# Patient Record
Sex: MALE | Race: OTHER | NOT HISPANIC OR LATINO
[De-identification: names, ages, dates, MRNs, and addresses within clinical notes are randomized per-mention and may not be internally consistent; named-entity substitution may affect disease eponyms.]

---

## 2019-07-23 ENCOUNTER — RESULT REVIEW (OUTPATIENT)
Age: 59
End: 2019-07-23

## 2019-12-03 ENCOUNTER — EMERGENCY (EMERGENCY)
Facility: HOSPITAL | Age: 59
LOS: 1 days | End: 2019-12-03
Attending: EMERGENCY MEDICINE
Payer: MEDICARE

## 2019-12-03 VITALS
TEMPERATURE: 98 F | HEART RATE: 74 BPM | RESPIRATION RATE: 16 BRPM | OXYGEN SATURATION: 97 % | DIASTOLIC BLOOD PRESSURE: 79 MMHG | SYSTOLIC BLOOD PRESSURE: 137 MMHG

## 2019-12-03 VITALS
HEART RATE: 92 BPM | HEIGHT: 68 IN | DIASTOLIC BLOOD PRESSURE: 82 MMHG | WEIGHT: 145.95 LBS | RESPIRATION RATE: 18 BRPM | TEMPERATURE: 98 F | OXYGEN SATURATION: 96 % | SYSTOLIC BLOOD PRESSURE: 150 MMHG

## 2019-12-03 LAB
ALBUMIN SERPL ELPH-MCNC: 4.1 G/DL — SIGNIFICANT CHANGE UP (ref 3.3–5)
ALP SERPL-CCNC: 92 U/L — SIGNIFICANT CHANGE UP (ref 40–120)
ALT FLD-CCNC: 13 U/L — SIGNIFICANT CHANGE UP (ref 10–45)
ANION GAP SERPL CALC-SCNC: 11 MMOL/L — SIGNIFICANT CHANGE UP (ref 5–17)
AST SERPL-CCNC: 15 U/L — SIGNIFICANT CHANGE UP (ref 10–40)
BASOPHILS # BLD AUTO: 0.01 K/UL — SIGNIFICANT CHANGE UP (ref 0–0.2)
BASOPHILS NFR BLD AUTO: 0.1 % — SIGNIFICANT CHANGE UP (ref 0–2)
BILIRUB SERPL-MCNC: 0.3 MG/DL — SIGNIFICANT CHANGE UP (ref 0.2–1.2)
BUN SERPL-MCNC: 14 MG/DL — SIGNIFICANT CHANGE UP (ref 7–23)
CALCIUM SERPL-MCNC: 8.9 MG/DL — SIGNIFICANT CHANGE UP (ref 8.4–10.5)
CHLORIDE SERPL-SCNC: 104 MMOL/L — SIGNIFICANT CHANGE UP (ref 96–108)
CO2 SERPL-SCNC: 24 MMOL/L — SIGNIFICANT CHANGE UP (ref 22–31)
CREAT SERPL-MCNC: 0.87 MG/DL — SIGNIFICANT CHANGE UP (ref 0.5–1.3)
CRP SERPL-MCNC: 0.15 MG/DL — SIGNIFICANT CHANGE UP (ref 0–0.4)
EOSINOPHIL # BLD AUTO: 0.08 K/UL — SIGNIFICANT CHANGE UP (ref 0–0.5)
EOSINOPHIL NFR BLD AUTO: 1.1 % — SIGNIFICANT CHANGE UP (ref 0–6)
ERYTHROCYTE [SEDIMENTATION RATE] IN BLOOD: 5 MM/HR — SIGNIFICANT CHANGE UP (ref 0–20)
GLUCOSE SERPL-MCNC: 97 MG/DL — SIGNIFICANT CHANGE UP (ref 70–99)
HCT VFR BLD CALC: 43.4 % — SIGNIFICANT CHANGE UP (ref 39–50)
HGB BLD-MCNC: 15.4 G/DL — SIGNIFICANT CHANGE UP (ref 13–17)
IMM GRANULOCYTES NFR BLD AUTO: 0.3 % — SIGNIFICANT CHANGE UP (ref 0–1.5)
LYMPHOCYTES # BLD AUTO: 2.52 K/UL — SIGNIFICANT CHANGE UP (ref 1–3.3)
LYMPHOCYTES # BLD AUTO: 34.7 % — SIGNIFICANT CHANGE UP (ref 13–44)
MCHC RBC-ENTMCNC: 29 PG — SIGNIFICANT CHANGE UP (ref 27–34)
MCHC RBC-ENTMCNC: 35.5 GM/DL — SIGNIFICANT CHANGE UP (ref 32–36)
MCV RBC AUTO: 81.7 FL — SIGNIFICANT CHANGE UP (ref 80–100)
MONOCYTES # BLD AUTO: 0.66 K/UL — SIGNIFICANT CHANGE UP (ref 0–0.9)
MONOCYTES NFR BLD AUTO: 9.1 % — SIGNIFICANT CHANGE UP (ref 2–14)
NEUTROPHILS # BLD AUTO: 3.97 K/UL — SIGNIFICANT CHANGE UP (ref 1.8–7.4)
NEUTROPHILS NFR BLD AUTO: 54.7 % — SIGNIFICANT CHANGE UP (ref 43–77)
NRBC # BLD: 0 /100 WBCS — SIGNIFICANT CHANGE UP (ref 0–0)
PLATELET # BLD AUTO: 244 K/UL — SIGNIFICANT CHANGE UP (ref 150–400)
POTASSIUM SERPL-MCNC: 4.1 MMOL/L — SIGNIFICANT CHANGE UP (ref 3.5–5.3)
POTASSIUM SERPL-SCNC: 4.1 MMOL/L — SIGNIFICANT CHANGE UP (ref 3.5–5.3)
PROT SERPL-MCNC: 6.5 G/DL — SIGNIFICANT CHANGE UP (ref 6–8.3)
RBC # BLD: 5.31 M/UL — SIGNIFICANT CHANGE UP (ref 4.2–5.8)
RBC # FLD: 12.8 % — SIGNIFICANT CHANGE UP (ref 10.3–14.5)
SODIUM SERPL-SCNC: 139 MMOL/L — SIGNIFICANT CHANGE UP (ref 135–145)
WBC # BLD: 7.26 K/UL — SIGNIFICANT CHANGE UP (ref 3.8–10.5)
WBC # FLD AUTO: 7.26 K/UL — SIGNIFICANT CHANGE UP (ref 3.8–10.5)

## 2019-12-03 PROCEDURE — 71260 CT THORAX DX C+: CPT

## 2019-12-03 PROCEDURE — 99284 EMERGENCY DEPT VISIT MOD MDM: CPT | Mod: 25

## 2019-12-03 PROCEDURE — 71260 CT THORAX DX C+: CPT | Mod: 26

## 2019-12-03 PROCEDURE — 73502 X-RAY EXAM HIP UNI 2-3 VIEWS: CPT

## 2019-12-03 PROCEDURE — 71045 X-RAY EXAM CHEST 1 VIEW: CPT

## 2019-12-03 PROCEDURE — 85027 COMPLETE CBC AUTOMATED: CPT

## 2019-12-03 PROCEDURE — 85652 RBC SED RATE AUTOMATED: CPT

## 2019-12-03 PROCEDURE — 71045 X-RAY EXAM CHEST 1 VIEW: CPT | Mod: 26

## 2019-12-03 PROCEDURE — 73502 X-RAY EXAM HIP UNI 2-3 VIEWS: CPT | Mod: 26,RT

## 2019-12-03 PROCEDURE — 86140 C-REACTIVE PROTEIN: CPT

## 2019-12-03 PROCEDURE — 80053 COMPREHEN METABOLIC PANEL: CPT

## 2019-12-03 PROCEDURE — 99284 EMERGENCY DEPT VISIT MOD MDM: CPT

## 2019-12-03 RX ORDER — LIDOCAINE 4 G/100G
1 CREAM TOPICAL ONCE
Refills: 0 | Status: COMPLETED | OUTPATIENT
Start: 2019-12-03 | End: 2019-12-03

## 2019-12-03 RX ORDER — IBUPROFEN 200 MG
600 TABLET ORAL ONCE
Refills: 0 | Status: COMPLETED | OUTPATIENT
Start: 2019-12-03 | End: 2019-12-03

## 2019-12-03 RX ADMIN — LIDOCAINE 1 PATCH: 4 CREAM TOPICAL at 11:51

## 2019-12-03 RX ADMIN — Medication 600 MILLIGRAM(S): at 13:33

## 2019-12-03 NOTE — ED PROVIDER NOTE - NSFOLLOWUPCLINICS_GEN_ALL_ED_FT
[facility] Orthopedic Surgery  Orthopedic Surgery  [address], 3rd & 4th [place] [address]  Phone: [phone]  Fax:   Follow Up Time:     [facility] General Internal Medicine  General Internal Medicine  [address] [place] [id number]  Phone: [phone]  Fax:   Follow Up Time:     [facility] Sports Medicine  Sports Medicine  [address] [place] [id number]  Phone: [phone]  Fax:   Follow Up Time:

## 2019-12-03 NOTE — ED PROVIDER NOTE - PROGRESS NOTE DETAILS
Attending Yamilka Pavon: ct scan showed evidence of osteochondroma. d/w pt results and importance of follow up. pt does not have a pcp and will provide him with information. pt does report improvement in pain to hip after motrin

## 2019-12-03 NOTE — ED PROVIDER NOTE - OBJECTIVE STATEMENT
PGY2/MD Juan. 60 yo M, with no medical follow up, scheduled for neck surgery for chronic neck pain, p/w right thigh pain/numbness and a lump on the right back. Worsening pain, x 7d, on the right hip, shooting with numbness, no weakness or fever. Denies urinary symptoms or defication issues. No fever, fatigue, or night sweat. No chest pain or sob. PGY2/MD Juan. 60 yo M, with no medical follow up, scheduled for neck surgery for chronic neck pain, p/w right thigh pain/numbness and a lump on the right back. Worsening pain, x 7d, on the right hip, shooting with numbness, no weakness or fever. Denies urinary symptoms or defication issues. No fever, fatigue, or night sweat. No chest pain or sob.  Attending Yamilka Pavon: 58 y/o male presenting with right hip pain with radiation down the side of the leg for last few days. pain is intermittent and worse with moving. no falls or trauma. able to walk. no fevers or chills. no recent surgeries. pain is reportedly shooting down his leg. has not tried anything for pain. additionally pt does report a lump to his upper back. was instructed by his neck doctor to have it checked out. has not seen a pcp recently.

## 2019-12-03 NOTE — ED ADULT NURSE NOTE - CHPI ED NUR SYMPTOMS NEG
no chills/no tingling/no vomiting/no nausea/no decreased eating/drinking/no dizziness/no fever/no weakness

## 2019-12-03 NOTE — ED PROVIDER NOTE - NSFOLLOWUPINSTRUCTIONS_ED_ALL_ED_FT
Your ct scan showed evidence of an osteochondroma. You were given the report of the imaging. It is very important that you follow up in with your doctor to monitor this and get repeat imaging as needed  Please return if you develop any fevers, any redness to your hip, or unable to walk  You can take tylenol and or motrin for the pain. You can take 400mg of motrin every 6 hours on a full stomach  Please follow up with the orthopedic doctors in 2-3 days for further monitoring

## 2019-12-03 NOTE — ED PROVIDER NOTE - PHYSICAL EXAMINATION
PGY2/MD Juan.   VITALS: reviewed  GEN: No apparent distress, A & O x 4  HEAD/EYES: NC/AT, PERRL, EOMI, anicteric sclerae, no conjunctival pallor  ENT: mucus membranes moist, oropharynx WNL, trachea midline, no JVD, neck is supple, no midline cervical tenderness  RESP: lungs CTA with equal breath sounds bilaterally, chest wall nontender and atraumatic  CV: heart with reg rhythm S1, S2, no murmur  ABDOMEN: normoactive bowel sounds, soft, nondistended, nontender, no palpable masses  : no CVAT  MSK: extremities atraumatic and nontender, no edema, no asymmetry. the back is without midline or lateral tenderness, there is no spinal deformity or stepoff and the back is ranged painlessly. the neck has no midline tenderness, deformity, or stepoff, and is ranged painlessly. + right hip, greater trochanter, local tenderness but no erythema. + right back, chest wall, demaculated, soft/solid, well mobilizing mump, x 5cm  SKIN: warm, dry, no rash, no bruising, no cyanosis. color appropriate for ethnicity  NEURO: alert, mentating appropriately, no facial asymmetry. gross sensation, motor, coordination are intact  PSYCH: Affect appropriate PGY2/MD Juan.   VITALS: reviewed  GEN: No apparent distress, A & O x 4  HEAD/EYES: NC/AT, PERRL, EOMI, anicteric sclerae, no conjunctival pallor  ENT: mucus membranes moist, oropharynx WNL, trachea midline, no JVD, neck is supple, no midline cervical tenderness  RESP: lungs CTA with equal breath sounds bilaterally, chest wall nontender and atraumatic  CV: heart with reg rhythm S1, S2, no murmur  ABDOMEN: normoactive bowel sounds, soft, nondistended, nontender, no palpable masses  : no CVAT  MSK: extremities atraumatic and nontender, no edema, no asymmetry. the back is without midline or lateral tenderness, there is no spinal deformity or stepoff and the back is ranged painlessly. the neck has no midline tenderness, deformity, or stepoff, and is ranged painlessly. + right hip, greater trochanter, local tenderness but no erythema. + right back, chest wall, demaculated, soft/solid, well mobilizing mump, x 5cm  SKIN: warm, dry, no rash, no bruising, no cyanosis. color appropriate for ethnicity  NEURO: alert, mentating appropriately, no facial asymmetry. gross sensation, motor, coordination are intact  PSYCH: Affect appropriate  Attending Yamilka Pavon: Gen: NAD, heent: atrauamtic, eomi, perrla, mmm, op pink, uvula midline, neck; nttp, no nuchal rigidity, chest: nttp, no crepitus, cv: rrr, no murmurs, lungs: ctab, abd: soft, nontender, nondistended, no peritoneal signs, +BS, no guarding, ext: wwp, ttp right greater tronchter area, no redness or warmth. able to ambulate  rasied area to back that Is firm and nontenderneg homans, skin: no rash, neuro: awake and alert, following commands, speech clear, sensation and strength intact, no focal deficits

## 2019-12-03 NOTE — ED PROVIDER NOTE - PATIENT PORTAL LINK FT
You can access the FollowMyHealth Patient Portal offered by [facility] by registering at the following website: [email or web address]. By joining [facility]’s FollowMyHealth portal, you will also be able to view your health information using other applications (apps) compatible with our system.

## 2019-12-03 NOTE — ED PROVIDER NOTE - CLINICAL SUMMARY MEDICAL DECISION MAKING FREE TEXT BOX
PGY2/MD Juan. 60 yo M with no medical follow up, p/w right hip pain + right back lump. The lump is likely lipoma, but given sat 94-96%, will check xray chest to r/o pleural effusion. hip, no clinical evidence of septic joint, but given poor medical follow up, will check cbc, crp, esr to r/o infection, likely orthro arrhritis PGY2/MD Juan. 58 yo M with no medical follow up, p/w right hip pain + right back lump. The lump is likely lipoma, but given sat 94-96%, will check xray chest to r/o pleural effusion. hip, no clinical evidence of septic joint, but given poor medical follow up, will check cbc, crp, esr to r/o infection, likely orthro arrhritis  Attending Yamilka Pavon: 60 y/o male prsenting with right hip pain and concern for lump to back. on exam pt able to range hip. no evidence of erythema or warmth. low risk for septic hip. neuro exam unremarkable. no bowel or bladder incontinence to suggest spinal cord involvment or caudal equina. esr sent and wnl. pt does have  alump to his back. ct performed showing likely osteochondroma. d/w pt results and importance of following up with the spine center and given pcp information. given close return precautions for any neuro changes, or fevers

## 2019-12-03 NOTE — ED ADULT NURSE NOTE - OBJECTIVE STATEMENT
The patient is a 59 year old male coming from home The patient is a 59 year old male coming from home with no past medical history with chief complaint of 8/10 pain on from his right hip down his right leg up to his calf and The patient is a 59 year old male coming from home with no past medical history with chief complaint of 8/10 pain on from his right hip down his right leg up to his calf and pain to his left mid-back at the site of a lipoma. Patient rates pain at both sites as 8/10. Patient states onset of pain at both sites is one week ago. Patient describes right hip/leg pain as constant, throbbing pain that is exacerbated with movement. Patient denies fall. Denies trauma.  Patient ambulatory.  Denies headache, dizziness, vision changes, chest pain, shortness of breath, abdominal pain, nausea, vomiting, diarrhea, fevers, chills, dysuria, hematuria, recent illness travel or fall.

## 2019-12-03 NOTE — ED ADULT TRIAGE NOTE - CHIEF COMPLAINT QUOTE
numbness/ pain to rt hip x 4 days,  no recent fall/ injury, lump to left back x 2 days ago.  Denies cough, fever, rash

## 2019-12-03 NOTE — ED PROVIDER NOTE - ATTENDING CONTRIBUTION TO CARE
Attending MD Yamilka Pavon:  I personally have seen and examined this patient.  Resident note reviewed and agree on plan of care and except where noted.  See HPI, PE, and MDM for details.

## 2019-12-03 NOTE — ED ADULT NURSE REASSESSMENT NOTE - NS ED NURSE REASSESS COMMENT FT1
Patient currently rates pain as 8/10. Medicated with Ibuprofren, will continue to re-assess pain level. V/s are stable. Patient aware of plan to wait for CT results.

## 2019-12-03 NOTE — ED ADULT NURSE REASSESSMENT NOTE - NS ED NURSE REASSESS COMMENT FT1
Patient Patient returned from X-ray. Wife and daughter at bedside. IV inserted. Patient provided blanket for comfort. Patient informed of wait for labwork to come back before Ibuprofen can be administered.

## 2019-12-13 PROBLEM — Z78.9 OTHER SPECIFIED HEALTH STATUS: Chronic | Status: ACTIVE | Noted: 2019-12-03

## 2019-12-16 PROBLEM — Z00.00 ENCOUNTER FOR PREVENTIVE HEALTH EXAMINATION: Status: ACTIVE | Noted: 2019-12-16

## 2019-12-17 ENCOUNTER — APPOINTMENT (OUTPATIENT)
Dept: PULMONOLOGY | Facility: CLINIC | Age: 59
End: 2019-12-17
